# Patient Record
Sex: MALE | Race: WHITE | NOT HISPANIC OR LATINO
[De-identification: names, ages, dates, MRNs, and addresses within clinical notes are randomized per-mention and may not be internally consistent; named-entity substitution may affect disease eponyms.]

---

## 2019-02-03 PROBLEM — Z00.00 ENCOUNTER FOR PREVENTIVE HEALTH EXAMINATION: Status: ACTIVE | Noted: 2019-02-03

## 2019-02-19 ENCOUNTER — APPOINTMENT (OUTPATIENT)
Dept: HEART AND VASCULAR | Facility: CLINIC | Age: 44
End: 2019-02-19
Payer: MEDICAID

## 2019-02-19 VITALS
HEART RATE: 68 BPM | SYSTOLIC BLOOD PRESSURE: 136 MMHG | TEMPERATURE: 98.5 F | OXYGEN SATURATION: 98 % | BODY MASS INDEX: 24.65 KG/M2 | HEIGHT: 72.44 IN | WEIGHT: 184 LBS | DIASTOLIC BLOOD PRESSURE: 82 MMHG

## 2019-02-19 DIAGNOSIS — Z78.9 OTHER SPECIFIED HEALTH STATUS: ICD-10-CM

## 2019-02-19 DIAGNOSIS — R07.89 OTHER CHEST PAIN: ICD-10-CM

## 2019-02-19 DIAGNOSIS — R06.02 SHORTNESS OF BREATH: ICD-10-CM

## 2019-02-19 DIAGNOSIS — Z82.3 FAMILY HISTORY OF STROKE: ICD-10-CM

## 2019-02-19 DIAGNOSIS — Z82.49 FAMILY HISTORY OF ISCHEMIC HEART DISEASE AND OTHER DISEASES OF THE CIRCULATORY SYSTEM: ICD-10-CM

## 2019-02-19 DIAGNOSIS — Z72.3 LACK OF PHYSICAL EXERCISE: ICD-10-CM

## 2019-02-19 PROCEDURE — 99401 PREV MED CNSL INDIV APPRX 15: CPT | Mod: 25

## 2019-02-19 PROCEDURE — 93000 ELECTROCARDIOGRAM COMPLETE: CPT

## 2019-02-19 PROCEDURE — 99366 TEAM CONF W/PAT BY HC PROF: CPT

## 2019-02-19 PROCEDURE — 99204 OFFICE O/P NEW MOD 45 MIN: CPT | Mod: 25

## 2019-02-19 RX ORDER — LORAZEPAM 0.5 MG/1
0.5 TABLET ORAL 3 TIMES DAILY
Qty: 3 | Refills: 0 | Status: ACTIVE | COMMUNITY

## 2019-02-19 NOTE — DISCUSSION/SUMMARY
[FreeTextEntry1] : The number of diagnostic and/or management options \par \par chest pain -atypical but brother with /MI age 40 2d echo\par \par sob - anxiity versus cad - cariopulm met test and 2d echo\par \par \par \par \par Labs, radiology: ekg\par \par Aspirin therapy: no\par \par LDL: n/a\par \par Medical team conference with interdisciplinary team of health care professionals, patient and/or family not present, 34 minutes, discussion of the case with another healthcare provider: emmy\par \par \par Overall Risk: High Complexity\par \par \par Additional detailed discussion regarding prevention including but not limiting to goal SBP<130mmHg, Mediterranean diet discussion, No salt diet, diabetes screening, and goal LDL<100. Smoking cessation, EtOH use and depression screen where applicable\par Exercise counseling and plan discussed with patient\par will readdress and reinforce prevention in subsequent visits

## 2019-02-19 NOTE — HISTORY OF PRESENT ILLNESS
[FreeTextEntry1] : 43 M no CRF brother with ?MI 40 \par  100% compliant with meds\par location: chest\par duration: constant\par  modifying factors: rest\par timing: worse after exercise\par severity: 4/10\par EKG nsr no st changes\par \par fhx brother ?mi\par \par sochx non smoker\par

## 2019-02-19 NOTE — REASON FOR VISIT
[Initial Evaluation] : an initial evaluation of [Palpitations] : palpitations [Chest Pain] : chest pain

## 2019-03-05 ENCOUNTER — APPOINTMENT (OUTPATIENT)
Dept: GASTROENTEROLOGY | Facility: CLINIC | Age: 44
End: 2019-03-05
Payer: MEDICAID

## 2019-03-05 VITALS
HEIGHT: 72.44 IN | WEIGHT: 183 LBS | BODY MASS INDEX: 24.52 KG/M2 | HEART RATE: 76 BPM | OXYGEN SATURATION: 96 % | RESPIRATION RATE: 14 BRPM | DIASTOLIC BLOOD PRESSURE: 100 MMHG | SYSTOLIC BLOOD PRESSURE: 150 MMHG

## 2019-03-05 DIAGNOSIS — K59.00 CONSTIPATION, UNSPECIFIED: ICD-10-CM

## 2019-03-05 DIAGNOSIS — R10.13 EPIGASTRIC PAIN: ICD-10-CM

## 2019-03-05 PROCEDURE — 99203 OFFICE O/P NEW LOW 30 MIN: CPT

## 2019-03-05 RX ORDER — POLYETHYLENE GLYCOL 3350 17 G/17G
17 POWDER, FOR SOLUTION ORAL DAILY
Qty: 1 | Refills: 2 | Status: ACTIVE | COMMUNITY
Start: 2019-03-05 | End: 1900-01-01

## 2019-03-05 RX ORDER — OMEPRAZOLE 20 MG/1
20 CAPSULE, DELAYED RELEASE ORAL DAILY
Qty: 30 | Refills: 2 | Status: ACTIVE | COMMUNITY
Start: 2019-03-05 | End: 1900-01-01

## 2019-03-05 NOTE — HISTORY OF PRESENT ILLNESS
[de-identified] : 44 YO M h/o insomnia/anxiety, depression, anemia, s/p hernia repair (1982) referred to Dr. Gomez for evaluation of epigastric burning sensation x 12 months now worsened in the last 5 months associated with straining. Denies palliating. Worsened with lying back. Symptoms primarily occurs in the evening with "grumbling noise." Patient also reports constipation and straining x 1 year. Patient states that he takes Benefiber daily to BID with improvement. He has 1 BM daily, described as BSS 1-2 and occasionally thin. Reports unintentional 25 lb weight loss, which he attributes to poor sleep, heartburn when he eats late in the evening, and nausea. Denies acidic taste, regurgitation, dysphagia, odynophagia, abdominal pain, vomiting, melena, hematochezia. Patient reports periods of constipation followed by diarrhea.

## 2019-03-05 NOTE — ASSESSMENT
[FreeTextEntry1] : 42 YO M h/o insomnia/anxiety, depression, anemia, s/p hernia repair (1982) referred to Dr. Gomez for evaluation of epigastric burning sensation x 12 months now worsened in the last 5 months associated with straining.\par \par 1. Dyspepsia vs atypical GERD\par - Check H. pylori breath test, if negative can consider EGD for further work-up\par - Start Omeprazole 20 mg PO daily x 8 weeks\par \par 2. Constipation/Change in stool caliber\par - R/B/A/I discussed. Patient amenable to proceed with colonoscopy at [facility]\par - Standard preparation information provided\par - Recommended trial of Miralax 17 g PO daily and can titrate up to 34 g BID\par \par RTC post colonoscopy

## 2019-03-05 NOTE — PHYSICAL EXAM
[General Appearance - Alert] : alert [General Appearance - In No Acute Distress] : in no acute distress [General Appearance - Well Nourished] : well nourished [General Appearance - Well Developed] : well developed [General Appearance - Well-Appearing] : healthy appearing [Sclera] : the sclera and conjunctiva were normal [PERRL With Normal Accommodation] : pupils were equal in size, round, and reactive to light [Extraocular Movements] : extraocular movements were intact [Outer Ear] : the ears and nose were normal in appearance [Hearing Threshold Finger Rub Not [place]] : hearing was normal [Examination Of The Oral Cavity] : the lips and gums were normal [Oropharynx] : the oropharynx was normal [Neck Appearance] : the appearance of the neck was normal [Exaggerated Use Of Accessory Muscles For Inspiration] : no accessory muscle use [Edema] : there was no peripheral edema [Bowel Sounds] : normal bowel sounds [Abdomen Soft] : soft [Abdomen Tenderness] : non-tender [Cervical Lymph Nodes Enlarged Posterior Bilaterally] : posterior cervical [Cervical Lymph Nodes Enlarged Anterior Bilaterally] : anterior cervical [No CVA Tenderness] : no ~M costovertebral angle tenderness [Abnormal Walk] : normal gait [Skin Color & Pigmentation] : normal skin color and pigmentation [] : no rash [No Focal Deficits] : no focal deficits [Oriented To Time, Place, And Person] : oriented to person, place, and time [Affect] : the affect was normal [Mood] : the mood was normal

## 2019-03-07 LAB — UREA BREATH TEST QL: NEGATIVE

## 2019-03-14 ENCOUNTER — APPOINTMENT (OUTPATIENT)
Dept: HEART AND VASCULAR | Facility: CLINIC | Age: 44
End: 2019-03-14

## 2019-03-18 ENCOUNTER — APPOINTMENT (OUTPATIENT)
Dept: HEART AND VASCULAR | Facility: CLINIC | Age: 44
End: 2019-03-18

## 2019-03-19 ENCOUNTER — APPOINTMENT (OUTPATIENT)
Dept: HEART AND VASCULAR | Facility: CLINIC | Age: 44
End: 2019-03-19
Payer: MEDICAID

## 2019-03-19 PROCEDURE — 93306 TTE W/DOPPLER COMPLETE: CPT

## 2019-03-29 ENCOUNTER — RESULT REVIEW (OUTPATIENT)
Age: 44
End: 2019-03-29

## 2019-03-29 ENCOUNTER — APPOINTMENT (OUTPATIENT)
Dept: GASTROENTEROLOGY | Facility: CLINIC | Age: 44
End: 2019-03-29
Payer: MEDICAID

## 2019-03-29 PROCEDURE — 45378 DIAGNOSTIC COLONOSCOPY: CPT

## 2019-04-15 ENCOUNTER — APPOINTMENT (OUTPATIENT)
Dept: HEART AND VASCULAR | Facility: CLINIC | Age: 44
End: 2019-04-15

## 2019-12-20 ENCOUNTER — TRANSCRIPTION ENCOUNTER (OUTPATIENT)
Age: 44
End: 2019-12-20

## 2021-05-09 ENCOUNTER — EMERGENCY (EMERGENCY)
Facility: HOSPITAL | Age: 46
LOS: 1 days | Discharge: HOME | End: 2021-05-09
Admitting: EMERGENCY MEDICINE
Payer: SELF-PAY

## 2021-05-09 VITALS — SYSTOLIC BLOOD PRESSURE: 166 MMHG | HEART RATE: 104 BPM | DIASTOLIC BLOOD PRESSURE: 92 MMHG

## 2021-05-09 VITALS
HEART RATE: 131 BPM | SYSTOLIC BLOOD PRESSURE: 181 MMHG | DIASTOLIC BLOOD PRESSURE: 103 MMHG | TEMPERATURE: 98 F | HEIGHT: 72 IN | RESPIRATION RATE: 20 BRPM | OXYGEN SATURATION: 99 % | WEIGHT: 175.05 LBS

## 2021-05-09 DIAGNOSIS — F17.200 NICOTINE DEPENDENCE, UNSPECIFIED, UNCOMPLICATED: ICD-10-CM

## 2021-05-09 DIAGNOSIS — R00.2 PALPITATIONS: ICD-10-CM

## 2021-05-09 DIAGNOSIS — F12.129 CANNABIS ABUSE WITH INTOXICATION, UNSPECIFIED: ICD-10-CM

## 2021-05-09 DIAGNOSIS — R42 DIZZINESS AND GIDDINESS: ICD-10-CM

## 2021-05-09 DIAGNOSIS — F41.9 ANXIETY DISORDER, UNSPECIFIED: ICD-10-CM

## 2021-05-09 LAB
ALBUMIN SERPL ELPH-MCNC: 4.1 G/DL — SIGNIFICANT CHANGE UP (ref 3.4–5)
ALP SERPL-CCNC: 84 U/L — SIGNIFICANT CHANGE UP (ref 40–120)
ALT FLD-CCNC: 53 U/L — HIGH (ref 12–42)
ANION GAP SERPL CALC-SCNC: 15 MMOL/L — SIGNIFICANT CHANGE UP (ref 9–16)
AST SERPL-CCNC: 91 U/L — HIGH (ref 15–37)
BASOPHILS # BLD AUTO: 0.09 K/UL — SIGNIFICANT CHANGE UP (ref 0–0.2)
BASOPHILS NFR BLD AUTO: 0.9 % — SIGNIFICANT CHANGE UP (ref 0–2)
BILIRUB SERPL-MCNC: 0.5 MG/DL — SIGNIFICANT CHANGE UP (ref 0.2–1.2)
BUN SERPL-MCNC: 15 MG/DL — SIGNIFICANT CHANGE UP (ref 7–23)
CALCIUM SERPL-MCNC: 9.1 MG/DL — SIGNIFICANT CHANGE UP (ref 8.5–10.5)
CHLORIDE SERPL-SCNC: 104 MMOL/L — SIGNIFICANT CHANGE UP (ref 96–108)
CO2 SERPL-SCNC: 20 MMOL/L — LOW (ref 22–31)
CREAT SERPL-MCNC: 1.39 MG/DL — HIGH (ref 0.5–1.3)
EOSINOPHIL # BLD AUTO: 0.65 K/UL — HIGH (ref 0–0.5)
EOSINOPHIL NFR BLD AUTO: 6.6 % — HIGH (ref 0–6)
GLUCOSE SERPL-MCNC: 162 MG/DL — HIGH (ref 70–99)
HCT VFR BLD CALC: 43.5 % — SIGNIFICANT CHANGE UP (ref 39–50)
HGB BLD-MCNC: 14.2 G/DL — SIGNIFICANT CHANGE UP (ref 13–17)
IMM GRANULOCYTES NFR BLD AUTO: 0.3 % — SIGNIFICANT CHANGE UP (ref 0–1.5)
LYMPHOCYTES # BLD AUTO: 2.91 K/UL — SIGNIFICANT CHANGE UP (ref 1–3.3)
LYMPHOCYTES # BLD AUTO: 29.4 % — SIGNIFICANT CHANGE UP (ref 13–44)
MAGNESIUM SERPL-MCNC: 1.9 MG/DL — SIGNIFICANT CHANGE UP (ref 1.6–2.6)
MCHC RBC-ENTMCNC: 27.3 PG — SIGNIFICANT CHANGE UP (ref 27–34)
MCHC RBC-ENTMCNC: 32.6 GM/DL — SIGNIFICANT CHANGE UP (ref 32–36)
MCV RBC AUTO: 83.7 FL — SIGNIFICANT CHANGE UP (ref 80–100)
MONOCYTES # BLD AUTO: 1.09 K/UL — HIGH (ref 0–0.9)
MONOCYTES NFR BLD AUTO: 11 % — SIGNIFICANT CHANGE UP (ref 2–14)
NEUTROPHILS # BLD AUTO: 5.14 K/UL — SIGNIFICANT CHANGE UP (ref 1.8–7.4)
NEUTROPHILS NFR BLD AUTO: 51.8 % — SIGNIFICANT CHANGE UP (ref 43–77)
NRBC # BLD: 0 /100 WBCS — SIGNIFICANT CHANGE UP (ref 0–0)
PLATELET # BLD AUTO: 184 K/UL — SIGNIFICANT CHANGE UP (ref 150–400)
POTASSIUM SERPL-MCNC: 4.4 MMOL/L — SIGNIFICANT CHANGE UP (ref 3.5–5.3)
POTASSIUM SERPL-SCNC: 4.4 MMOL/L — SIGNIFICANT CHANGE UP (ref 3.5–5.3)
PROT SERPL-MCNC: 7.8 G/DL — SIGNIFICANT CHANGE UP (ref 6.4–8.2)
RBC # BLD: 5.2 M/UL — SIGNIFICANT CHANGE UP (ref 4.2–5.8)
RBC # FLD: 13.3 % — SIGNIFICANT CHANGE UP (ref 10.3–14.5)
SODIUM SERPL-SCNC: 139 MMOL/L — SIGNIFICANT CHANGE UP (ref 132–145)
TROPONIN I SERPL-MCNC: <0.017 NG/ML — LOW (ref 0.02–0.06)
TSH SERPL-MCNC: 0.94 UIU/ML — SIGNIFICANT CHANGE UP (ref 0.36–3.74)
WBC # BLD: 9.91 K/UL — SIGNIFICANT CHANGE UP (ref 3.8–10.5)
WBC # FLD AUTO: 9.91 K/UL — SIGNIFICANT CHANGE UP (ref 3.8–10.5)

## 2021-05-09 PROCEDURE — 99285 EMERGENCY DEPT VISIT HI MDM: CPT

## 2021-05-09 PROCEDURE — 99053 MED SERV 10PM-8AM 24 HR FAC: CPT

## 2021-05-09 PROCEDURE — 93010 ELECTROCARDIOGRAM REPORT: CPT

## 2021-05-09 RX ORDER — SODIUM CHLORIDE 9 MG/ML
2000 INJECTION INTRAMUSCULAR; INTRAVENOUS; SUBCUTANEOUS ONCE
Refills: 0 | Status: COMPLETED | OUTPATIENT
Start: 2021-05-09 | End: 2021-05-09

## 2021-05-09 RX ORDER — FAMOTIDINE 10 MG/ML
20 INJECTION INTRAVENOUS ONCE
Refills: 0 | Status: COMPLETED | OUTPATIENT
Start: 2021-05-09 | End: 2021-05-09

## 2021-05-09 RX ORDER — DIPHENHYDRAMINE HCL 50 MG
50 CAPSULE ORAL ONCE
Refills: 0 | Status: COMPLETED | OUTPATIENT
Start: 2021-05-09 | End: 2021-05-09

## 2021-05-09 RX ORDER — ONDANSETRON 8 MG/1
4 TABLET, FILM COATED ORAL ONCE
Refills: 0 | Status: COMPLETED | OUTPATIENT
Start: 2021-05-09 | End: 2021-05-09

## 2021-05-09 RX ADMIN — ONDANSETRON 4 MILLIGRAM(S): 8 TABLET, FILM COATED ORAL at 01:48

## 2021-05-09 RX ADMIN — Medication 50 MILLIGRAM(S): at 01:48

## 2021-05-09 RX ADMIN — FAMOTIDINE 20 MILLIGRAM(S): 10 INJECTION INTRAVENOUS at 01:48

## 2021-05-09 RX ADMIN — SODIUM CHLORIDE 2000 MILLILITER(S): 9 INJECTION INTRAMUSCULAR; INTRAVENOUS; SUBCUTANEOUS at 01:39

## 2021-05-09 NOTE — ED PROVIDER NOTE - PATIENT PORTAL LINK FT
You can access the FollowMyHealth Patient Portal offered by  by registering at the following website: [email or web address]. By joining [employer]’s FollowMyHealth portal, you will also be able to view your health information using other applications (apps) compatible with our system.

## 2021-05-09 NOTE — ED PROVIDER NOTE - PHYSICAL EXAMINATION
Vital Signs - nursing notes reviewed and confirmed  Gen - WDWN, anxious appearing, comfortable and non-toxic appearing, speaking in full sentences   Skin - warm, dry, intact  HEENT - AT/NC, PERRL, EOMI, no conjunctival injection, moist oral mucosa, TM intact b/l with good cone of lights, o/p clear with no erythema, edema, or exudate, uvula midline, airway patent, neck supple and NT, FROM, no JVD or carotid bruits b/l, no palpable nodes  CV - S1S2, rapid rate, regular rhythm  Resp - respiration non-labored, CTAB, symmetric bs b/l, no r/r/w  GI - NABS, soft, ND, NT, no rebound or guarding, no CVAT b/l   MS - w/w/p, no c/c/e, calves supple and NT, distal pulses symmetric b/l, brisk cap refills, +SILT  Neuro - AxOx3, no focal neuro deficits, CN II-XII grossly intact, cerebellar function intact, negative pronator drift, negative nystagmus, ambulatory without gait disturbance Vital Signs - nursing notes reviewed and confirmed  Gen - WDWN M, anxious appearing, comfortable and non-toxic appearing, speaking in full sentences   Skin - warm, dry, intact  HEENT - AT/NC, PERRL, EOMI, no conjunctival injection, moist oral mucosa, TM intact b/l with good cone of lights, o/p clear with no erythema, edema, or exudate, uvula midline, airway patent, neck supple and NT, FROM, no JVD or carotid bruits b/l, no palpable nodes  CV - S1S2, rapid rate, regular rhythm  Resp - respiration non-labored, CTAB, symmetric bs b/l, no r/r/w  GI - NABS, soft, ND, NT, no rebound or guarding, no CVAT b/l   MS - w/w/p, no c/c/e, calves supple and NT, distal pulses symmetric b/l, brisk cap refills, +SILT  Neuro - AxOx3, no focal neuro deficits, CN II-XII grossly intact, cerebellar function intact, negative pronator drift, negative nystagmus, ambulatory without gait disturbance

## 2021-05-09 NOTE — ED ADULT TRIAGE NOTE - CHIEF COMPLAINT QUOTE
LINDA from home with c/o palpitations and dizziness after taking 2x THC edible gummies. Pt took his own Ativan 0.5mg prior to arrival.

## 2021-05-09 NOTE — ED PROVIDER NOTE - CARE PROVIDER_API CALL
your PMD,   Phone: (   )    -  Fax: (   )    -  Follow Up Time:     Asa Shah)  Cardiovascular Disease  [address] [place] [address]  Phone: [phone]  Fax: [phone]  Follow Up Time:

## 2021-05-09 NOTE — ED ADULT NURSE NOTE - NSIMPLEMENTINTERV_GEN_ALL_ED
Implemented All Universal Safety Interventions:  [place] to call system. Call bell, personal items and telephone within reach. Instruct patient to call for assistance. Room bathroom lighting operational. Non-slip footwear when patient is off stretcher. Physically safe environment: no spills, clutter or unnecessary equipment. Stretcher in lowest position, wheels locked, appropriate side rails in place.

## 2021-05-09 NOTE — ED PROVIDER NOTE - OBJECTIVE STATEMENT
44 yo M with no known PMHx, BIBA for palpitations, 44 yo M with no known PMHx, BIBA for palpitations and lightheadedness s/p taking 2 edibles tonight. Pt reports doing edibles for the first time, had 2 and noted palpitations with lightheadedness and subjective sob. Attempted alleviation of sx 1 tab of ativan PTA to the ED with no improvement.  Denies fever, chills, CP, diaphoresis, ORTIZ, orthopnea, cough, hemoptysis, wheezing, peripheral edema, focal weakness, numbness, tingling, paresthesia, HA, LOC, neck pain, N/V/D/C, abdominal pain, change in urinary/bowel function, trauma, fall, rash, and malaise.

## 2021-05-09 NOTE — ED PROVIDER NOTE - CLINICAL SUMMARY MEDICAL DECISION MAKING FREE TEXT BOX
pt p/w palpitations and dizziness s/p edible ingestion tonight, denies other illicits, HR in 130s on arrival, anxious appearing, EKG with sinus tachy with no ischemic changes, labs unremarkable, sx improved s/p supportive care and IVF, encouraged drug cessation, counseling provided, medically stable for dc

## 2021-05-09 NOTE — ED ADULT NURSE REASSESSMENT NOTE - NS ED NURSE REASSESS COMMENT FT1
Pt care handd over to myself from chelsi lakhani, pt sitting upright in bed using mobile phone remains on cardiac monitor ST @ 110 bpm, awaiting further review

## 2021-05-09 NOTE — ED PROVIDER NOTE - NSFOLLOWUPINSTRUCTIONS_ED_ALL_ED_FT
Cannabis Use Disorder    Cannabis use disorder occurs when marijuana use disrupts a person's daily life or causes health problems. This condition can be dangerous. The health problems this condition can cause include:  •Long-lasting problems with thinking and learning. These can be permanent in young people.      •Mental health problems, such as severe anxiety, paranoia, hallucinations, or schizophrenia.      •Dangerously high blood pressure and heart rate.      •Breathing problems.      •Problems with child development during and after pregnancy.      People with this condition are also more likely to use other drugs.      What are the causes?    This condition is caused by using marijuana too much over time. It is not caused by using it only once in a while. Many people with this condition use marijuana because it gives them a feeling of extreme pleasure or relaxation.      What increases the risk?  This condition is more likely to develop in:  •Men.      •People with a family history of cannabis use disorder.      •People with mental health issues such as depression or post-traumatic stress disorder (PTSD).        What are the signs or symptoms?    Symptoms of this condition include:    Addiction     •Using greater amounts of marijuana than you want to, or using marijuana for longer than you want to.      •Craving marijuana.      •Spending a lot of time getting marijuana, using it, or recovering from its effects.      •Having problems at work, at school, at home, or in relationships because of marijuana use.      •Giving up or cutting down on important life activities because of marijuana use.      •Using marijuana at times when it is dangerous, such as while you are driving a car.      •Needing more and more marijuana to get the same desired effect (building up a tolerance).      •Lack of motivation, known as amotivational syndrome, which leads to poor school and work performance.      Physical problems     •A long-lasting cough.      •Bronchitis.      •Emphysema.      •Throat and lung cancer.      Mental problems     •Psychosis.      •Anxiety.      •Trouble sleeping.      •Increase in violent behavior in young people.      Withdrawal problems   You may have symptoms when you stop using marijuana. Symptoms include:  •Irritability or anger.      •Anxiety or restlessness.      •Trouble sleeping.      •Loss of appetite or weight loss.      •Aches and pains.      •Shakiness, sweating, or chills.        How is this diagnosed?  This condition is diagnosed with an assessment. During the assessment, your health care provider will ask about your marijuana use and how it affects your life. You will be diagnosed with the condition if you have had at least two symptoms of this condition within a 12-month period. How severe the condition is depends on how many symptoms you have.  •If you have two to three symptoms, your condition is mild.      •If you have four to five symptoms, your condition is moderate.      •If you have six or more symptoms, your condition is severe.      Your health care provider may perform a physical exam or do lab tests to see if you have physical problems resulting from marijuana use. Your health care provider may also screen for drug use and refer you to a mental health professional for evaluation.      How is this treated?  Treatment for this condition is usually provided by mental health professionals with training in substance use disorders. Your treatment may involve:•Counseling. This treatment is also called talk therapy. It is provided by substance use treatment counselors. A counselor can address the reasons you use marijuana and suggest ways to keep you from using it again. The goals of talk therapy are to:  •Find healthy activities to replace using marijuana.      •Identify and avoid the things that trigger your marijuana use.      •Help you learn how to handle cravings.        •Support groups. Support groups are led by people who have quit using marijuana. They provide emotional support, advice, and guidance.      •Medicine. Medicine is used to treat mental health issues that trigger marijuana use or that result from it.        Follow these instructions at home:      Lifestyle   Make healthy lifestyle choices, such as:  •Eating a healthy diet.      •Getting enough exercise.      •Improving your stress-management skills.      General instructions     •Take over-the-counter, prescription medicines, and herbal remedies only as told by your health care provider.      •Check with your health care provider before starting any new medicines.      •Work with your counselor or group to develop tools to keep you from using marijuana again (relapsing).      •Learn daily living skills and work skills.      •Keep all follow-up visits as told by your health care provider. This is important.        Where to find more information    •National [place] on Drug Abuse: www.drugabuse.gov      •Centers for Disease Control and Prevention: www.cdc.gov      •Substance Abuse and Mental Health Services Administration: www.samhsa.gov        Contact a health care provider if:    •You are not able to take your medicines as told.      •Your symptoms get worse.        Get help right away if:    •You have serious thoughts about hurting yourself or others.    If you ever feel like you may hurt yourself or others, or have thoughts about taking your own life, get help right away. You can go to your nearest emergency department or call:   • Your local emergency services (911 in the [place]       • A suicide crisis helpline, such as the National Suicide Prevention Lifeline at 1-524.779.2697. This is open 24 hours a day in the [place]         Summary    •Cannabis use disorder is when using marijuana disrupts a person's daily life or causes health problems.      •This condition is caused by using marijuana too much over time.      •Treatment for this condition is usually provided by mental health professionals with training in substance use disorders.      •Treatment may involve counseling, support groups, or medicine.

## 2021-05-18 NOTE — ED PROVIDER NOTE - IV ALTEPLASE EXCL ABS HIDDEN
ED Provider Note    CHIEF COMPLAINT  Chief Complaint   Patient presents with   • Syncope     Pt states remembers walking out to deck, found lying supine on deck by daughter   • Hand Injury     Pt c/o bilat hand pain       HPI  Freeman Frost is a 80 y.o. male who presents after being found down on the ground in his front yard.  Patient does not know why he fell down, cannot remember.  It is possible he passed out, had syncopal event 2 years ago according to the family member.  Patient is currently making sense.  He denies headache.  He does recall he hit his head, he takes Eliquis for atrial fibrillation.  Patient suffered multiple cuts to hands and elbows including skin tears.  No neck pain, denies arthralgia, no rib pain, no pelvic pain.  No acute numbness or weakness.  Patient states his tetanus shot is up-to-date    REVIEW OF SYSTEMS  Ear nose throat: No facial pain  Respiratory: Shortness of breath or pleurisy  Gastrointestinal: No abdominal pain, no vomiting  Musculoskeletal: Denies arthralgia  Neurologic: Possible head injury.  Cardiac: Syncope, no chest pain  Skin: Multiple skin tears.  Right hand laceration     All other systems are negative.       PAST MEDICAL HISTORY  Past Medical History:   Diagnosis Date   • Arrhythmia    • Bowel habit changes     constipation   • Cataract    • Dental disorder     upper   • Hypercholesterolemia    • Hypertension    • Snoring    • Stroke (HCC) 2013    tia no residual def       FAMILY HISTORY  Family History   Problem Relation Age of Onset   • Dementia Mother    • Cancer Mother         Breast   • Cancer Brother         Esophageal       SOCIAL HISTORY  Social History     Socioeconomic History   • Marital status:      Spouse name: Not on file   • Number of children: Not on file   • Years of education: Not on file   • Highest education level: Not on file   Occupational History   • Not on file   Tobacco Use   • Smoking status: Never Smoker   • Smokeless tobacco:  Never Used   Vaping Use   • Vaping Use: Never used   Substance and Sexual Activity   • Alcohol use: Yes   • Drug use: No   • Sexual activity: Not on file   Other Topics Concern   • Not on file   Social History Narrative   • Not on file     Social Determinants of Health     Financial Resource Strain:    • Difficulty of Paying Living Expenses:    Food Insecurity:    • Worried About Running Out of Food in the Last Year:    • Ran Out of Food in the Last Year:    Transportation Needs:    • Lack of Transportation (Medical):    • Lack of Transportation (Non-Medical):    Physical Activity:    • Days of Exercise per Week:    • Minutes of Exercise per Session:    Stress:    • Feeling of Stress :    Social Connections:    • Frequency of Communication with Friends and Family:    • Frequency of Social Gatherings with Friends and Family:    • Attends [employer] Services:    • Active Member of Clubs or Organizations:    • Attends Club or Organization Meetings:    • Marital Status:    Intimate Partner Violence:    • Fear of Current or Ex-Partner:    • Emotionally Abused:    • Physically Abused:    • Sexually Abused:        SURGICAL HISTORY  Past Surgical History:   Procedure Laterality Date   • PB ERCP,DIAGNOSTIC N/A 4/20/2019    Procedure: ERCP, DIAGNOSTIC;  Surgeon: Antony Morrison M.D.;  Location: [facility];  Service: Gastroenterology   • FRANCIA BY LAPAROSCOPY  4/19/2019    Procedure: LAPARASCOPIC- CONVERTED TO OPEN CHOLECYSTECTOMY;  Surgeon: Anita Merino M.D.;  Location: [facility];  Service: General   • CATARACT PHACO WITH IOL Right 8/28/2018    Procedure: CATARACT PHACO WITH IOL;  Surgeon: Adolfo Santana M.D.;  Location: SURGERY SAME DAY [facility];  Service: Ophthalmology   • CATARACT PHACO WITH IOL Left 8/14/2018    Procedure: CATARACT PHACO WITH IOL;  Surgeon: Adolfo Santana M.D.;  Location: SURGERY SAME DAY [facility];  Service: Ophthalmology   • HERNIA REPAIR, INCISIONAL,  "UNILATERAL Left    • OTHER      tonsils as a child   • TONSILLECTOMY         CURRENT MEDICATIONS  No current facility-administered medications on file prior to encounter.     Current Outpatient Medications on File Prior to Encounter   Medication Sig Dispense Refill   • apixaban (ELIQUIS) 5mg Tab Take 1 Tab by mouth 2 Times a Day. 180 Tab 3   • metoprolol SR (TOPROL XL) 100 MG TABLET SR 24 HR Take 1 Tab by mouth every day. 90 Tab 3   • ferrous sulfate 325 (65 Fe) MG tablet Take 325 mg by mouth every day.     • losartan (COZAAR) 25 MG Tab   3   • Multiple Vitamins-Minerals (PRESERVISION AREDS) Tab Take 1 Capsule by mouth every evening.     • amLODIPine (NORVASC) 5 MG Tab Take 5 mg by mouth every day.     • atorvastatin (LIPITOR) 10 MG Tab Take 10 mg by mouth every evening.         ALLERGIES  Allergies   Allergen Reactions   • Other Environmental      Seasonal allergies       PHYSICAL EXAM  VITAL SIGNS: /62   Pulse 94   Temp 36.7 °C (98 °F) (Temporal)   Resp 15   Ht 1.753 m (5' 9\")   Wt 75.9 kg (167 lb 5.3 oz)   SpO2 94%   BMI 24.71 kg/m²    Constitutional: Well-nourished, no distress  HENT: Atraumatic  Eyes: Pupils are equal to millimeters, Conjunctiva normal, No discharge.   Neck: Nontender  Cardiovascular: Normal heart rate, irregular rhythm   Pulmonary: Equal  breath sounds, No wheezing or rales.  Normal air movement  GI: Soft and nontender, no guarding  Skin: Skin tear right elbow.  Skin tear left wrist.  1 cm laceration right palm, protruding subcutaneous fat  Vascular: Normal capillary refill all extremities  Musculoskeletal: Ribs, spine, pelvis, extremities are nontender  Neurologic: Sensation normal, strength normal, speech clear.    RADIOLOGY/PROCEDURES  DX-KNEE COMPLETE 4+ RIGHT   Final Result      No evidence of acute fracture or dislocation.      Prominent prepatellar soft tissue swelling.      Atherosclerotic plaque.            CT-HEAD W/O   Final Result      1.  Diffuse atrophy and mild " white matter changes.   2.  No acute intracranial hemorrhage or territorial infarct.   3.  Small chronic LEFT occipital infarct.         DX-CHEST-PORTABLE (1 VIEW)   Final Result      No radiographic evidence of acute cardiopulmonary process.      EC-ECHOCARDIOGRAM COMPLETE W/O CONT    (Results Pending)         Labs  Results for orders placed or performed during the hospital encounter of 05/17/21   CBC WITH DIFFERENTIAL   Result Value Ref Range    WBC 6.5 4.8 - 10.8 K/uL    RBC 4.26 (L) 4.70 - 6.10 M/uL    Hemoglobin 14.6 14.0 - 18.0 g/dL    Hematocrit 44.0 42.0 - 52.0 %    .3 (H) 81.4 - 97.8 fL    MCH 34.3 (H) 27.0 - 33.0 pg    MCHC 33.2 (L) 33.7 - 35.3 g/dL    RDW 50.3 (H) 35.9 - 50.0 fL    Platelet Count 246 164 - 446 K/uL    MPV 8.9 (L) 9.0 - 12.9 fL    Neutrophils-Polys 69.40 44.00 - 72.00 %    Lymphocytes 17.90 (L) 22.00 - 41.00 %    Monocytes 8.30 0.00 - 13.40 %    Eosinophils 2.20 0.00 - 6.90 %    Basophils 1.40 0.00 - 1.80 %    Immature Granulocytes 0.80 0.00 - 0.90 %    Nucleated RBC 0.00 /100 WBC    Neutrophils (Absolute) 4.51 1.82 - 7.42 K/uL    Lymphs (Absolute) 1.16 1.00 - 4.80 K/uL    Monos (Absolute) 0.54 0.00 - 0.85 K/uL    Eos (Absolute) 0.14 0.00 - 0.51 K/uL    Baso (Absolute) 0.09 0.00 - 0.12 K/uL    Immature Granulocytes (abs) 0.05 0.00 - 0.11 K/uL    NRBC (Absolute) 0.00 K/uL   COMP METABOLIC PANEL   Result Value Ref Range    Sodium 138 135 - 145 mmol/L    Potassium 4.3 3.6 - 5.5 mmol/L    Chloride 103 96 - 112 mmol/L    Co2 20 20 - 33 mmol/L    Anion Gap 15.0 7.0 - 16.0    Glucose 100 (H) 65 - 99 mg/dL    Bun 19 8 - 22 mg/dL    Creatinine 1.11 0.50 - 1.40 mg/dL    Calcium 9.3 8.4 - 10.2 mg/dL    AST(SGOT) 19 12 - 45 U/L    ALT(SGPT) 10 2 - 50 U/L    Alkaline Phosphatase 127 (H) 30 - 99 U/L    Total Bilirubin 1.3 0.1 - 1.5 mg/dL    Albumin 4.3 3.2 - 4.9 g/dL    Total Protein 7.4 6.0 - 8.2 g/dL    Globulin 3.1 1.9 - 3.5 g/dL    A-G Ratio 1.4 g/dL   TROPONIN   Result Value Ref Range     Troponin T 16 6 - 19 ng/L   APTT   Result Value Ref Range    APTT 30.9 24.7 - 36.0 sec   PROTHROMBIN TIME (INR)   Result Value Ref Range    PT 14.9 (H) 12.0 - 14.6 sec    INR 1.20 (H) 0.87 - 1.13   ESTIMATED GFR   Result Value Ref Range    GFR If African American >60 >60 mL/min/1.73 m 2    GFR If Non African American >60 >60 mL/min/1.73 m 2   SARS-CoV-2 PCR (24 hour In-House): Collect NP swab in VTM    Specimen: Respirate   Result Value Ref Range    SARS-CoV-2 Source NP Swab    EKG   Result Value Ref Range    Report       [facility] Emergency Dept.    Test Date:  2021  Pt Name:    ANKITA DAVID                 Department: [facility]  MRN:        [id number]                      Room:  Gender:     Male                         Technician:   :        [id number]                   Requested By:ER TRIAGE PROTOCOL  Order #:    [id number]                    Reading MD: GERDA ISAAC MD    Measurements  Intervals                                Axis  Rate:       91                           P:          -13  MO:         164                          QRS:        -49  QRSD:       83                           T:          25  QT:         352  QTc:        434    Interpretive Statements  Sinus rhythm  Inferior infarct, old  Compared to ECG 2019 18:39:19  EKG is unchanged from previous  Electronically Signed On 2021 19:06:13 PDT by GERDA ISAAC MD       Laceration Repair Procedure Note    Indication: Laceration    Procedure: The patient was placed in the appropriate position and anesthesia around the laceration was obtained by infiltration using 1% Lidocaine without epinephrine. The area was then cleansed with betadine and draped in a sterile fashion and irrigated with high pressure normal saline. The laceration was closed with 4-0 Ethilon using interrupted sutures. There were no additional lacerations requiring repair. The wound area was then dressed with bacitracin and a bandage.       Total repaired wound length: 1 cm.     Other Items: Suture count: 2    The patient tolerated the procedure well.    Complications: None        COURSE & MEDICAL DECISION MAKING  Pertinent Labs & Imaging studies reviewed. (See chart for details)  Patient with syncope today of unknown etiology, differential includes vasovagal event, orthostasis, cardiac arrhythmia.  He did hit his head by history, CT scan therefore obtained to rule out intracranial hemorrhage as the patient is currently taking Eliquis.  This was a negative CT scan.  I did convey the findings of old infarction with the patient and his family, they states this is known.  The patient is currently neurologically intact excepting poor memory which the daughter states the patient is undergoing work-up for.  Laceration repaired, patient advised to have sutures out in 7 days.  Patient is hospitalized for ongoing evaluation and treatment.    FINAL IMPRESSION     1. Syncope, unspecified syncope type     2. Contusion of scalp, initial encounter     3. Laceration of right hand without foreign body, initial encounter               Electronically signed by: Richard Adamson M.D., 5/17/2021      show

## 2022-03-03 ENCOUNTER — EMERGENCY (EMERGENCY)
Facility: HOSPITAL | Age: 47
LOS: 1 days | Discharge: HOME | End: 2022-03-03
Attending: EMERGENCY MEDICINE | Admitting: EMERGENCY MEDICINE
Payer: SELF-PAY

## 2022-03-03 VITALS
SYSTOLIC BLOOD PRESSURE: 107 MMHG | HEART RATE: 79 BPM | RESPIRATION RATE: 18 BRPM | OXYGEN SATURATION: 95 % | DIASTOLIC BLOOD PRESSURE: 105 MMHG | HEIGHT: 72 IN | WEIGHT: 240.08 LBS | TEMPERATURE: 97 F

## 2022-03-03 VITALS
OXYGEN SATURATION: 97 % | HEART RATE: 79 BPM | SYSTOLIC BLOOD PRESSURE: 157 MMHG | DIASTOLIC BLOOD PRESSURE: 98 MMHG | TEMPERATURE: 99 F | RESPIRATION RATE: 18 BRPM

## 2022-03-03 DIAGNOSIS — K83.1 OBSTRUCTION OF BILE DUCT: ICD-10-CM

## 2022-03-03 DIAGNOSIS — Z88.0 ALLERGY STATUS TO PENICILLIN: ICD-10-CM

## 2022-03-03 DIAGNOSIS — K52.9 NONINFECTIVE GASTROENTERITIS AND COLITIS, UNSPECIFIED: ICD-10-CM

## 2022-03-03 DIAGNOSIS — Z20.822 CONTACT WITH AND (SUSPECTED) EXPOSURE TO COVID-19: ICD-10-CM

## 2022-03-03 DIAGNOSIS — R10.31 RIGHT LOWER QUADRANT PAIN: ICD-10-CM

## 2022-03-03 LAB
ALBUMIN SERPL ELPH-MCNC: 3.7 G/DL — SIGNIFICANT CHANGE UP (ref 3.4–5)
ALP SERPL-CCNC: 99 U/L — SIGNIFICANT CHANGE UP (ref 40–120)
ALT FLD-CCNC: 156 U/L — HIGH (ref 12–42)
ANION GAP SERPL CALC-SCNC: 6 MMOL/L — LOW (ref 9–16)
APPEARANCE UR: CLEAR — SIGNIFICANT CHANGE UP
AST SERPL-CCNC: 103 U/L — HIGH (ref 15–37)
BASOPHILS # BLD AUTO: 0.05 K/UL — SIGNIFICANT CHANGE UP (ref 0–0.2)
BASOPHILS NFR BLD AUTO: 0.5 % — SIGNIFICANT CHANGE UP (ref 0–2)
BILIRUB SERPL-MCNC: 0.7 MG/DL — SIGNIFICANT CHANGE UP (ref 0.2–1.2)
BILIRUB UR-MCNC: NEGATIVE — SIGNIFICANT CHANGE UP
BUN SERPL-MCNC: 11 MG/DL — SIGNIFICANT CHANGE UP (ref 7–23)
CALCIUM SERPL-MCNC: 9.3 MG/DL — SIGNIFICANT CHANGE UP (ref 8.5–10.5)
CHLORIDE SERPL-SCNC: 105 MMOL/L — SIGNIFICANT CHANGE UP (ref 96–108)
CO2 SERPL-SCNC: 27 MMOL/L — SIGNIFICANT CHANGE UP (ref 22–31)
COLOR SPEC: YELLOW — SIGNIFICANT CHANGE UP
CREAT SERPL-MCNC: 1.03 MG/DL — SIGNIFICANT CHANGE UP (ref 0.5–1.3)
DIFF PNL FLD: ABNORMAL
EGFR: 91 ML/MIN/1.73M2 — SIGNIFICANT CHANGE UP
EOSINOPHIL # BLD AUTO: 0.12 K/UL — SIGNIFICANT CHANGE UP (ref 0–0.5)
EOSINOPHIL NFR BLD AUTO: 1.3 % — SIGNIFICANT CHANGE UP (ref 0–6)
GLUCOSE SERPL-MCNC: 114 MG/DL — HIGH (ref 70–99)
GLUCOSE UR QL: NEGATIVE — SIGNIFICANT CHANGE UP
HCT VFR BLD CALC: 45.6 % — SIGNIFICANT CHANGE UP (ref 39–50)
HGB BLD-MCNC: 14.9 G/DL — SIGNIFICANT CHANGE UP (ref 13–17)
IMM GRANULOCYTES NFR BLD AUTO: 1.3 % — SIGNIFICANT CHANGE UP (ref 0–1.5)
KETONES UR-MCNC: ABNORMAL MG/DL
LEUKOCYTE ESTERASE UR-ACNC: NEGATIVE — SIGNIFICANT CHANGE UP
LIDOCAIN IGE QN: 88 U/L — SIGNIFICANT CHANGE UP (ref 73–393)
LYMPHOCYTES # BLD AUTO: 0.7 K/UL — LOW (ref 1–3.3)
LYMPHOCYTES # BLD AUTO: 7.3 % — LOW (ref 13–44)
MCHC RBC-ENTMCNC: 27.2 PG — SIGNIFICANT CHANGE UP (ref 27–34)
MCHC RBC-ENTMCNC: 32.7 GM/DL — SIGNIFICANT CHANGE UP (ref 32–36)
MCV RBC AUTO: 83.4 FL — SIGNIFICANT CHANGE UP (ref 80–100)
MONOCYTES # BLD AUTO: 0.51 K/UL — SIGNIFICANT CHANGE UP (ref 0–0.9)
MONOCYTES NFR BLD AUTO: 5.3 % — SIGNIFICANT CHANGE UP (ref 2–14)
NEUTROPHILS # BLD AUTO: 8.04 K/UL — HIGH (ref 1.8–7.4)
NEUTROPHILS NFR BLD AUTO: 84.3 % — HIGH (ref 43–77)
NITRITE UR-MCNC: NEGATIVE — SIGNIFICANT CHANGE UP
NRBC # BLD: 0 /100 WBCS — SIGNIFICANT CHANGE UP (ref 0–0)
PH UR: 7 — SIGNIFICANT CHANGE UP (ref 5–8)
PLATELET # BLD AUTO: 154 K/UL — SIGNIFICANT CHANGE UP (ref 150–400)
POTASSIUM SERPL-MCNC: 5.4 MMOL/L — HIGH (ref 3.5–5.3)
POTASSIUM SERPL-SCNC: 5.4 MMOL/L — HIGH (ref 3.5–5.3)
PROT SERPL-MCNC: 7.9 G/DL — SIGNIFICANT CHANGE UP (ref 6.4–8.2)
PROT UR-MCNC: NEGATIVE MG/DL — SIGNIFICANT CHANGE UP
RBC # BLD: 5.47 M/UL — SIGNIFICANT CHANGE UP (ref 4.2–5.8)
RBC # FLD: 13.2 % — SIGNIFICANT CHANGE UP (ref 10.3–14.5)
RBC CASTS # UR COMP ASSIST: < 5 /HPF — SIGNIFICANT CHANGE UP
SARS-COV-2 RNA SPEC QL NAA+PROBE: SIGNIFICANT CHANGE UP
SODIUM SERPL-SCNC: 138 MMOL/L — SIGNIFICANT CHANGE UP (ref 132–145)
SP GR SPEC: 1.01 — SIGNIFICANT CHANGE UP (ref 1–1.03)
UROBILINOGEN FLD QL: 0.2 E.U./DL — SIGNIFICANT CHANGE UP
WBC # BLD: 9.54 K/UL — SIGNIFICANT CHANGE UP (ref 3.8–10.5)
WBC # FLD AUTO: 9.54 K/UL — SIGNIFICANT CHANGE UP (ref 3.8–10.5)
WBC UR QL: < 5 /HPF — SIGNIFICANT CHANGE UP

## 2022-03-03 PROCEDURE — 99285 EMERGENCY DEPT VISIT HI MDM: CPT

## 2022-03-03 PROCEDURE — 99053 MED SERV 10PM-8AM 24 HR FAC: CPT

## 2022-03-03 PROCEDURE — 74177 CT ABD & PELVIS W/CONTRAST: CPT | Mod: 26

## 2022-03-03 PROCEDURE — 76705 ECHO EXAM OF ABDOMEN: CPT | Mod: 26

## 2022-03-03 RX ORDER — ONDANSETRON 8 MG/1
4 TABLET, FILM COATED ORAL ONCE
Refills: 0 | Status: DISCONTINUED | OUTPATIENT
Start: 2022-03-03 | End: 2022-03-06

## 2022-03-03 RX ORDER — ACETAMINOPHEN 500 MG
975 TABLET ORAL ONCE
Refills: 0 | Status: COMPLETED | OUTPATIENT
Start: 2022-03-03 | End: 2022-03-03

## 2022-03-03 RX ORDER — IOHEXOL 300 MG/ML
30 INJECTION, SOLUTION INTRAVENOUS ONCE
Refills: 0 | Status: COMPLETED | OUTPATIENT
Start: 2022-03-03 | End: 2022-03-03

## 2022-03-03 RX ORDER — SODIUM CHLORIDE 9 MG/ML
1000 INJECTION INTRAMUSCULAR; INTRAVENOUS; SUBCUTANEOUS ONCE
Refills: 0 | Status: COMPLETED | OUTPATIENT
Start: 2022-03-03 | End: 2022-03-03

## 2022-03-03 RX ADMIN — Medication 975 MILLIGRAM(S): at 07:12

## 2022-03-03 RX ADMIN — IOHEXOL 30 MILLILITER(S): 300 INJECTION, SOLUTION INTRAVENOUS at 07:12

## 2022-03-03 RX ADMIN — SODIUM CHLORIDE 1000 MILLILITER(S): 9 INJECTION INTRAMUSCULAR; INTRAVENOUS; SUBCUTANEOUS at 07:03

## 2022-03-03 RX ADMIN — Medication 975 MILLIGRAM(S): at 08:29

## 2022-03-03 RX ADMIN — SODIUM CHLORIDE 1000 MILLILITER(S): 9 INJECTION INTRAMUSCULAR; INTRAVENOUS; SUBCUTANEOUS at 08:29

## 2022-03-03 NOTE — ED PROVIDER NOTE - NS ED ROS FT
· CONSTITUTIONAL: chills  · EYES: no visual changes  · ENMT: no change/loss in taste  · CARDIOVASCULAR: no cp  · RESPIRATORY: no sob,   · GASTROINTESTINAL: pain, nausea, loose stool   · MUSCULOSKELETAL: no pain  · SKIN: no bleeding  · ENDOCRINE: no DM  · NEURO: no headache

## 2022-03-03 NOTE — ED ADULT NURSE NOTE - OBJECTIVE STATEMENT
46y male presents w/ RLQ pain radiating to his back, nausea, and 1 episode of diarrhea. Denies fevers, reports chills.  No pmh or surgeries.

## 2022-03-03 NOTE — ED ADULT NURSE REASSESSMENT NOTE - NS ED NURSE REASSESS COMMENT FT1
rec'd report for SAVI Hamilton- pt with fluids infusing, states he just had a US done, pt refuses pain meds or nausea meds and has no medical complaints at this time-

## 2022-03-03 NOTE — ED PROVIDER NOTE - PHYSICAL EXAMINATION
Physical Exam  GEN: Awake, alert, non-toxic appearing,  EYES: full EOMI,  ENT: External inspection normal, normal voice,   HEAD: atraumatic  NECK: FROM neck, supple,   RESP: no tachypnea, no hypoxia, no resp distress,  GI: tender right mid abd, no ruq tenderness, neg anaya, neg rovsing, no rebound/guarding  : chaperoned by RN  MSK: nelida x 4  SKIN: no abd wall rash

## 2022-03-03 NOTE — ED PROVIDER NOTE - OBJECTIVE STATEMENT
46 yom pw rlq abd pain, w/ nausea, and 1 episode of loose stool, since 3pm.  pain persistent.  +chills.  pain rad to back.  no scrotal/testicular pain.  no prior abd surg.  denies sx yesterday.  no cough/sob/bodyache/fatigue.

## 2022-03-03 NOTE — ED PROVIDER NOTE - PROGRESS NOTE DETAILS
I assumed care of the patient from Dr. Spence at change of shift pending results of CT abdomen/pelvis and ultrasound abdomen. Both show hepatic steatosis, no other acute findings. Patient with improvement in pain. There is absolutely  no abdominal tenderness. Understands instructions about BRAT diet, good hydration, improve diet and  increase exercise to help with fatty liver. Will follow up with PCP and return to ED if symptoms worsen. HILARY Robertson

## 2022-03-03 NOTE — ED PROVIDER NOTE - NSFOLLOWUPINSTRUCTIONS_ED_ALL_ED_FT
Please follow a gentle diet like the BRAT diet (Bananas, Rice, Applesauce, Toast) and drink plenty of fluids for the next few days until you're feeling better. If you feel nauseous, you can take Zofran. Come back to the ER if you're feeling much worse. Follow up with your PCP. Your CT showed some fatty infiltration of your liver, which is usually from poor diet and too little exercise. When you're feeling better in a few days, try to start exercising and cutting down on processed foods and increase fruits and vegetables.       ________________________________________________  Gastroenteritis    WHAT YOU NEED TO KNOW:    Gastroenteritis, or stomach flu, is an infection of the stomach and intestines.     Digestive Tract         DISCHARGE INSTRUCTIONS:    Call 911 for any of the following:   •You have trouble breathing or a very fast pulse.          Return to the emergency department if:   •You see blood in your diarrhea.      •You cannot stop vomiting.      •You have not urinated for 12 hours.       •You feel like you are going to faint.      Contact your healthcare provider if:   •You have a fever.      •You continue to vomit or have diarrhea, even after treatment.      •You see worms in your diarrhea.      •Your mouth or eyes are dry. You are not urinating as much or as often.      •You have questions or concerns about your condition or care.      Medicines:   •Medicines may be given to stop vomiting or diarrhea, decrease abdominal cramps, or treat an infection.      •Take your medicine as directed. Contact your healthcare provider if you think your medicine is not helping or if you have side effects. Tell him or her if you are allergic to any medicine. Keep a list of the medicines, vitamins, and herbs you take. Include the amounts, and when and why you take them. Bring the list or the pill bottles to follow-up visits. Carry your medicine list with you in case of an emergency.      Manage your symptoms:   •Drink liquids as directed. Ask your healthcare provider how much liquid to drink each day, and which liquids are best for you. You may also need to drink an oral rehydration solution (ORS). An ORS has the right amounts of sugar, salt, and minerals in water to replace body fluids.      •Eat bland foods. When you feel hungry, begin eating soft, bland foods. Examples are bananas, clear soup, potatoes, and applesauce. Do not have dairy products, alcohol, sugary drinks, or drinks with caffeine until you feel better.      •Rest as much as possible. Slowly start to do more each day when you begin to feel better.      Prevent the spread of gastroenteritis: Gastroenteritis can spread easily. Keep yourself, your family, and your surroundings clean to help prevent the spread of gastroenteritis:   •Wash your hands often. Use soap and water. Wash your hands after you use the bathroom, change a child's diapers, or sneeze. Wash your hands before you prepare or eat food.   Handwashing           •Clean surfaces and do laundry often. Wash your clothes and towels separately from the rest of the laundry. Clean surfaces in your home with antibacterial  or bleach.      •Clean food thoroughly and cook safely. Wash raw vegetables before you cook. Cook meat, fish, and eggs fully. Do not use the same dishes for raw meat as you do for other foods. Refrigerate any leftover food immediately.      •Be aware when you camp or travel. Drink only clean water. Do not drink from rivers or lakes unless you purify or boil the water first. When you travel, drink bottled water and do not add ice. Do not eat fruit that has not been peeled. Do not eat raw fish or meat that is not fully cooked.       Follow up with your doctor as directed: Write down your questions so you remember to ask them during your visits.     ___________________________________________________________________        Non-Alcoholic Fatty Liver Disease    WHAT YOU NEED TO KNOW:    Non-alcoholic fatty liver disease (NAFLD) is a buildup of fat in your liver from a condition other than alcohol abuse.    Abdominal Organs         DISCHARGE INSTRUCTIONS:    Call your local emergency number (911 in the [place]) or have someone call if:   •You have shortness of breath.      •You have trouble thinking clearly or are confused.      Return to the emergency department if:   •You feel lightheaded or faint.      •You have shaking, chills, and a fever.      Call your doctor or liver specialist if:   •You have more pain or swelling in your abdomen.      •You feel more tired than usual.      •You bruise or bleed easily.      •Your skin or the whites of your eyes look yellow.      •You have questions or concerns about your condition or care.      Medicines:   •Medicines may be given to manage blood sugar or cholesterol levels.      •Take your medicine as directed. Contact your healthcare provider if you think your medicine is not helping or if you have side effects. Tell him or her if you are allergic to any medicine. Keep a list of the medicines, vitamins, and herbs you take. Include the amounts, and when and why you take them. Bring the list or the pill bottles to follow-up visits. Carry your medicine list with you in case of an emergency.      Manage NAFLD:   •Maintain a healthy weight. Ask your healthcare provider how what a healthy weight is for you. Ask him or her to help you create a weight loss plan if you are overweight.      •Exercise as directed. Aerobic exercise 3 times a week for 20 to 45 minutes can help decrease fat buildup in your liver. Examples are cycling, brisk walking, and jogging. Ask your healthcare provider about the best exercise plan for you.   FAMILY WALKING FOR EXERCISE           •Eat a variety of healthy foods. Healthy foods include vegetables, fruit, whole-grain breads, low-fat dairy products, beans, lean meats, and fish. Foods low in simple carbohydrates, high-fructose corn syrup, and trans fat may help decrease fat buildup in your liver.  Healthy Foods           •Do not drink alcohol. Alcohol may make NAFLD worse and harm your liver. Ask your healthcare provider for information if you need help to quit drinking.      Follow up with your doctor as directed: You may need to return for more tests. You may also be referred to a specialist. Write down your questions so you remember to ask them during your visits.

## 2022-03-03 NOTE — ED PROVIDER NOTE - PATIENT PORTAL LINK FT
You can access the FollowMyHealth Patient Portal offered by [facility] by registering at the following website: [email or web address]. By joining [facility]’s FollowMyHealth portal, you will also be able to view your health information using other applications (apps) compatible with our system.

## 2022-03-03 NOTE — ED ADULT TRIAGE NOTE - BSA (M2)
Patient Education     Urinary Tract Infections in Women    Urinary tract infections (UTIs) are most often caused by bacteria. These bacteria enter the urinary tract. The bacteria may come from outside the body. Or they may travel from the skin outside the rectum or vagina into the urethra. Female anatomy makes it easy for bacteria from the bowel to enter a woman’s urinary tract, which is the most common source of UTI. This means women develop UTIs more often than men. Pain in or around the urinary tract is a common UTI symptom. But the only way to know for sure if you have a UTI for the healthcare provider to test your urine. The two tests that may be done are the urinalysis and urine culture.  Types of UTIs  · Cystitis. A bladder infection (cystitis) is the most common UTI in women. You may have urgent or frequent urination. You may also have pain, burning when you urinate, and bloody urine.  · Urethritis. This is an inflamed urethra, which is the tube that carries urine from the bladder to outside the body. You may have lower stomach or back pain. You may also have urgent or frequent urination.  · Pyelonephritis. This is a kidney infection. If not treated, it can be serious and damage your kidneys. In severe cases, you may need to stay in the hospital. You may have a fever and lower back pain.  Medicines to treat a UTI  Most UTIs are treated with antibiotics. These kill the bacteria. The length of time you need to take them depends on the type of infection. It may be as short as 3 days. If you have repeated UTIs, you may need a low-dose antibiotic for several months. Take antibiotics exactly as directed. Don’t stop taking them until all of the medicine is gone. If you stop taking the antibiotic too soon, the infection may not go away. You may also develop a resistance to the antibiotic. This can make it much harder to treat.  Lifestyle changes to treat and prevent UTIs  The lifestyle changes below will help get  rid of your UTI. They may also help prevent future UTIs.  · Drink plenty of fluids. This includes water, juice, or other caffeine-free drinks. Fluids help flush bacteria out of your body.  · Empty your bladder. Always empty your bladder when you feel the urge to urinate. And always urinate before going to sleep. Urine that stays in your bladder can lead to infection. Try to urinate before and after sex as well.  · Practice good personal hygiene. Wipe yourself from front to back after using the toilet. This helps keep bacteria from getting into the urethra.  · Use condoms during sex. These help prevent UTIs caused by sexually transmitted bacteria. Also don't use spermicides during sex. These can increase the risk for UTIs. Choose other forms of birth control instead. For women who tend to get UTIs after sex, a low-dose of a preventive antibiotic may be used. Be sure to discuss this option with your healthcare provider.  · Follow up with your healthcare provider as directed. He or she may test to make sure the infection has cleared. If needed, more treatment may be started.  Date Last Reviewed: 1/1/2017  © 0447-4410 [employer]. [address] [place] [address], [place] [address]. All rights reserved. This information is not intended as a substitute for professional medical care. Always follow your healthcare professional's instructions.            2.3

## 2023-12-16 NOTE — ED ADULT TRIAGE NOTE - NSWEIGHTCALCTOOLDRUG_GEN_A_CORE
used Phoebe Rao  Colon/Rectal Surgery  [address], [address] [place] [address]  Phone: [phone]  Fax: [phone]  Follow Up Time:    Phoebe Rao  Colon/Rectal Surgery  [address], [address] [place] [address]  Phone: [phone]  Fax: [phone]  Follow Up Time:

## 2023-12-21 ENCOUNTER — EMERGENCY (EMERGENCY)
Facility: HOSPITAL | Age: 48
LOS: 1 days | Discharge: HOME | End: 2023-12-21
Attending: EMERGENCY MEDICINE | Admitting: EMERGENCY MEDICINE
Payer: MEDICAID

## 2023-12-21 VITALS
DIASTOLIC BLOOD PRESSURE: 71 MMHG | RESPIRATION RATE: 18 BRPM | HEART RATE: 103 BPM | OXYGEN SATURATION: 95 % | TEMPERATURE: 98 F | SYSTOLIC BLOOD PRESSURE: 128 MMHG

## 2023-12-21 VITALS
HEART RATE: 122 BPM | OXYGEN SATURATION: 95 % | TEMPERATURE: 98 F | DIASTOLIC BLOOD PRESSURE: 82 MMHG | SYSTOLIC BLOOD PRESSURE: 144 MMHG | RESPIRATION RATE: 18 BRPM

## 2023-12-21 LAB
ALBUMIN SERPL ELPH-MCNC: 3.7 G/DL — SIGNIFICANT CHANGE UP (ref 3.4–5)
ALBUMIN SERPL ELPH-MCNC: 3.7 G/DL — SIGNIFICANT CHANGE UP (ref 3.4–5)
ALP SERPL-CCNC: 70 U/L — SIGNIFICANT CHANGE UP (ref 40–120)
ALP SERPL-CCNC: 70 U/L — SIGNIFICANT CHANGE UP (ref 40–120)
ALT FLD-CCNC: 40 U/L — SIGNIFICANT CHANGE UP (ref 12–42)
ALT FLD-CCNC: 40 U/L — SIGNIFICANT CHANGE UP (ref 12–42)
ANION GAP SERPL CALC-SCNC: 10 MMOL/L — SIGNIFICANT CHANGE UP (ref 9–16)
ANION GAP SERPL CALC-SCNC: 10 MMOL/L — SIGNIFICANT CHANGE UP (ref 9–16)
AST SERPL-CCNC: 44 U/L — HIGH (ref 15–37)
AST SERPL-CCNC: 44 U/L — HIGH (ref 15–37)
BILIRUB SERPL-MCNC: 0.8 MG/DL — SIGNIFICANT CHANGE UP (ref 0.2–1.2)
BILIRUB SERPL-MCNC: 0.8 MG/DL — SIGNIFICANT CHANGE UP (ref 0.2–1.2)
BUN SERPL-MCNC: 7 MG/DL — SIGNIFICANT CHANGE UP (ref 7–23)
BUN SERPL-MCNC: 7 MG/DL — SIGNIFICANT CHANGE UP (ref 7–23)
CALCIUM SERPL-MCNC: 9 MG/DL — SIGNIFICANT CHANGE UP (ref 8.5–10.5)
CALCIUM SERPL-MCNC: 9 MG/DL — SIGNIFICANT CHANGE UP (ref 8.5–10.5)
CHLORIDE SERPL-SCNC: 101 MMOL/L — SIGNIFICANT CHANGE UP (ref 96–108)
CHLORIDE SERPL-SCNC: 101 MMOL/L — SIGNIFICANT CHANGE UP (ref 96–108)
CO2 SERPL-SCNC: 23 MMOL/L — SIGNIFICANT CHANGE UP (ref 22–31)
CO2 SERPL-SCNC: 23 MMOL/L — SIGNIFICANT CHANGE UP (ref 22–31)
CREAT SERPL-MCNC: 1.19 MG/DL — SIGNIFICANT CHANGE UP (ref 0.5–1.3)
CREAT SERPL-MCNC: 1.19 MG/DL — SIGNIFICANT CHANGE UP (ref 0.5–1.3)
EGFR: 75 ML/MIN/1.73M2 — SIGNIFICANT CHANGE UP
EGFR: 75 ML/MIN/1.73M2 — SIGNIFICANT CHANGE UP
FLUAV AG NPH QL: DETECTED
FLUAV AG NPH QL: DETECTED
FLUBV AG NPH QL: SIGNIFICANT CHANGE UP
FLUBV AG NPH QL: SIGNIFICANT CHANGE UP
GLUCOSE SERPL-MCNC: 81 MG/DL — SIGNIFICANT CHANGE UP (ref 70–99)
GLUCOSE SERPL-MCNC: 81 MG/DL — SIGNIFICANT CHANGE UP (ref 70–99)
HCT VFR BLD CALC: 46.5 % — SIGNIFICANT CHANGE UP (ref 39–50)
HCT VFR BLD CALC: 46.5 % — SIGNIFICANT CHANGE UP (ref 39–50)
HGB BLD-MCNC: 14.6 G/DL — SIGNIFICANT CHANGE UP (ref 13–17)
HGB BLD-MCNC: 14.6 G/DL — SIGNIFICANT CHANGE UP (ref 13–17)
MCHC RBC-ENTMCNC: 27.3 PG — SIGNIFICANT CHANGE UP (ref 27–34)
MCHC RBC-ENTMCNC: 27.3 PG — SIGNIFICANT CHANGE UP (ref 27–34)
MCHC RBC-ENTMCNC: 31.4 GM/DL — LOW (ref 32–36)
MCHC RBC-ENTMCNC: 31.4 GM/DL — LOW (ref 32–36)
MCV RBC AUTO: 86.9 FL — SIGNIFICANT CHANGE UP (ref 80–100)
MCV RBC AUTO: 86.9 FL — SIGNIFICANT CHANGE UP (ref 80–100)
NRBC # BLD: 0 /100 WBCS — SIGNIFICANT CHANGE UP (ref 0–0)
NRBC # BLD: 0 /100 WBCS — SIGNIFICANT CHANGE UP (ref 0–0)
PLATELET # BLD AUTO: 126 K/UL — LOW (ref 150–400)
PLATELET # BLD AUTO: 126 K/UL — LOW (ref 150–400)
POTASSIUM SERPL-MCNC: 3.9 MMOL/L — SIGNIFICANT CHANGE UP (ref 3.5–5.3)
POTASSIUM SERPL-MCNC: 3.9 MMOL/L — SIGNIFICANT CHANGE UP (ref 3.5–5.3)
POTASSIUM SERPL-SCNC: 3.9 MMOL/L — SIGNIFICANT CHANGE UP (ref 3.5–5.3)
POTASSIUM SERPL-SCNC: 3.9 MMOL/L — SIGNIFICANT CHANGE UP (ref 3.5–5.3)
PROT SERPL-MCNC: 7.9 G/DL — SIGNIFICANT CHANGE UP (ref 6.4–8.2)
PROT SERPL-MCNC: 7.9 G/DL — SIGNIFICANT CHANGE UP (ref 6.4–8.2)
RBC # BLD: 5.35 M/UL — SIGNIFICANT CHANGE UP (ref 4.2–5.8)
RBC # BLD: 5.35 M/UL — SIGNIFICANT CHANGE UP (ref 4.2–5.8)
RBC # FLD: 13.8 % — SIGNIFICANT CHANGE UP (ref 10.3–14.5)
RBC # FLD: 13.8 % — SIGNIFICANT CHANGE UP (ref 10.3–14.5)
RSV RNA NPH QL NAA+NON-PROBE: SIGNIFICANT CHANGE UP
RSV RNA NPH QL NAA+NON-PROBE: SIGNIFICANT CHANGE UP
SARS-COV-2 RNA SPEC QL NAA+PROBE: SIGNIFICANT CHANGE UP
SARS-COV-2 RNA SPEC QL NAA+PROBE: SIGNIFICANT CHANGE UP
SODIUM SERPL-SCNC: 134 MMOL/L — SIGNIFICANT CHANGE UP (ref 132–145)
SODIUM SERPL-SCNC: 134 MMOL/L — SIGNIFICANT CHANGE UP (ref 132–145)
TROPONIN I, HIGH SENSITIVITY RESULT: 6.1 NG/L — SIGNIFICANT CHANGE UP
TROPONIN I, HIGH SENSITIVITY RESULT: 6.1 NG/L — SIGNIFICANT CHANGE UP
WBC # BLD: 6.86 K/UL — SIGNIFICANT CHANGE UP (ref 3.8–10.5)
WBC # BLD: 6.86 K/UL — SIGNIFICANT CHANGE UP (ref 3.8–10.5)
WBC # FLD AUTO: 6.86 K/UL — SIGNIFICANT CHANGE UP (ref 3.8–10.5)
WBC # FLD AUTO: 6.86 K/UL — SIGNIFICANT CHANGE UP (ref 3.8–10.5)

## 2023-12-21 PROCEDURE — 99284 EMERGENCY DEPT VISIT MOD MDM: CPT

## 2023-12-21 RX ORDER — ACETAMINOPHEN 500 MG
975 TABLET ORAL ONCE
Refills: 0 | Status: COMPLETED | OUTPATIENT
Start: 2023-12-21 | End: 2023-12-21

## 2023-12-21 RX ORDER — METOPROLOL TARTRATE 50 MG
25 TABLET ORAL ONCE
Refills: 0 | Status: DISCONTINUED | OUTPATIENT
Start: 2023-12-21 | End: 2023-12-21

## 2023-12-21 RX ORDER — KETOROLAC TROMETHAMINE 30 MG/ML
15 SYRINGE (ML) INJECTION ONCE
Refills: 0 | Status: DISCONTINUED | OUTPATIENT
Start: 2023-12-21 | End: 2023-12-21

## 2023-12-21 RX ORDER — SODIUM CHLORIDE 9 MG/ML
2000 INJECTION INTRAMUSCULAR; INTRAVENOUS; SUBCUTANEOUS ONCE
Refills: 0 | Status: COMPLETED | OUTPATIENT
Start: 2023-12-21 | End: 2023-12-21

## 2023-12-21 RX ADMIN — Medication 975 MILLIGRAM(S): at 19:50

## 2023-12-21 RX ADMIN — SODIUM CHLORIDE 2000 MILLILITER(S): 9 INJECTION INTRAMUSCULAR; INTRAVENOUS; SUBCUTANEOUS at 19:49

## 2023-12-21 RX ADMIN — Medication 15 MILLIGRAM(S): at 19:49

## 2023-12-21 NOTE — ED PROVIDER NOTE - CLINICAL SUMMARY MEDICAL DECISION MAKING FREE TEXT BOX
40-year-old male with palpitations in the setting of viral illness    Tachycardic in triage but other vital signs nonactionable  Exam as above    Assessment: Palpitations/tachycardia possibly secondary to hypovolemia versus viral versus other    Plan: EKG, IV fluids, labs, supportive care, reassess

## 2023-12-21 NOTE — ED PROVIDER NOTE - PATIENT PORTAL LINK FT
You can access the FollowMyHealth Patient Portal offered by [facility] by registering at the following website: [email or web address]. By joining [employer]’s FollowMyHealth portal, you will also be able to view your health information using other applications (apps) compatible with our system. You can access the FollowMyHealth Patient Portal offered by [facility] by registering at the following website: [email or web address]. By joining [employer]’s FollowMyHealth portal, you will also be able to view your health information using other applications (apps) compatible with our system.

## 2023-12-21 NOTE — ED PROVIDER NOTE - PHYSICAL EXAMINATION
GENERAL: well appearing, no acute distress   HEAD: atraumatic   EYES: EOMI   ENT: moist oral mucosa, Nasal congestion  CARDIAC: Tachycardic with rate 110, extremities warm and well-perfused, no edema  RESPIRATORY: no increased work of breathing, Lungs clear  MUSCULOSKELETAL: no deformity   NEUROLOGICAL: alert, spontaneous movement of extremities   SKIN: no visible rash  PSYCHIATRIC: cooperative

## 2023-12-21 NOTE — ED ADULT TRIAGE NOTE - CHIEF COMPLAINT QUOTE
pt biba for palpitations. states that he has had flu like symptoms for two days. given 200ml ns en route.

## 2023-12-21 NOTE — ED ADULT NURSE NOTE - PRO INTERPRETER NEED 2
CALLED DR ROCK TO OBTAIN ORDERS REGARDING NG TUBE-PLAN FOR NG TUBE TO STAY IN PLACE TO 
LOW INTERMITTENT SXN FOR NOW. VS-WNL, DSG-CDI English

## 2023-12-21 NOTE — ED ADULT NURSE NOTE - OBJECTIVE STATEMENT
Pt is a 48y male complaining of palpitations and flu  like symptoms x 2 days. Pt with history of anxiety and htn.

## 2023-12-21 NOTE — ED ADULT NURSE NOTE - NSFALLUNIVINTERV_ED_ALL_ED
Bed/Stretcher in lowest position, wheels locked, appropriate side rails in place/Call bell, personal items and telephone in reach/Instruct patient to call for assistance before getting out of bed/chair/stretcher/Non-slip footwear applied when patient is off stretcher/[place] to call system/Physically safe environment - no spills, clutter or unnecessary equipment/Purposeful proactive rounding/Room/bathroom lighting operational, light cord in reach Bed/Stretcher in lowest position, wheels locked, appropriate side rails in place/Call bell, personal items and telephone in reach/Instruct patient to call for assistance before getting out of bed/chair/stretcher/Non-slip footwear applied when patient is off stretcher/[place] to call system/Physically safe environment - no spills, clutter or unnecessary equipment/Purposeful proactive rounding/Room/bathroom lighting operational, light cord in reach

## 2023-12-21 NOTE — ED PROVIDER NOTE - PROGRESS NOTE DETAILS
Labs nonactionable  Influenza A positive  Vital signs improved after treatment  DC with supportive care and outpatient follow-up  Discussed indication for patient return to ED.  Patient understood.

## 2023-12-21 NOTE — ED PROVIDER NOTE - OBJECTIVE STATEMENT
40-year-old male past medical history of hypertension (On antihypertensives but does not know the name, did not take his medication today), panic attacks (recently given a prescription for Ativan), presents for evaluation of elevated heart rate that patient has noted times a few days and is checking his heart rate via an cj on his iPhone.  Also having nasal congestion and feels that he might have the flu.  Denies other acute complaints

## 2023-12-23 DIAGNOSIS — I10 ESSENTIAL (PRIMARY) HYPERTENSION: ICD-10-CM

## 2023-12-23 DIAGNOSIS — Z20.822 CONTACT WITH AND (SUSPECTED) EXPOSURE TO COVID-19: ICD-10-CM

## 2023-12-23 DIAGNOSIS — Z88.0 ALLERGY STATUS TO PENICILLIN: ICD-10-CM

## 2023-12-23 DIAGNOSIS — R00.0 TACHYCARDIA, UNSPECIFIED: ICD-10-CM

## 2023-12-23 DIAGNOSIS — R00.2 PALPITATIONS: ICD-10-CM

## 2023-12-23 DIAGNOSIS — F41.0 PANIC DISORDER [EPISODIC PAROXYSMAL ANXIETY]: ICD-10-CM
